# Patient Record
(demographics unavailable — no encounter records)

---

## 2024-11-06 NOTE — PLAN
[FreeTextEntry1] : Discussed at length with patient about triggers for tongue and lip swelling.  Provider is concerned about progressing to anaphylaxis, therefore EpiPen and  prednisone was ordered for prn use, along with the Diphendydramine  for as needed use. food allergies ordered, and patient will see allergist. Pt is an RN, and understands epi use

## 2024-11-06 NOTE — REVIEW OF SYSTEMS
[Heartburn] : heartburn [Joint Stiffness] : joint stiffness [Negative] : Heme/Lymph [FreeTextEntry4] : see hpi [FreeTextEntry7] : cannot take prilosec, takes tums [FreeTextEntry9] : hands are stiff at night

## 2024-11-06 NOTE — HISTORY OF PRESENT ILLNESS
[FreeTextEntry1] : f/u, BW, med renewal [FreeTextEntry8] : 6/5/2020: episodes of tongue and lip swelling on and off times 9 months ( About once every other month). Pt cannot pinpoint the trigger. symptoms relieved with advil pm. Patients sob, throat closing, wheezing or stridor. Last night, pt had trouble swallowing. \par  Pt also states that she as unrelated esophageal spasms on occaision when swallowing.\par  \par  Pt had maintained social isolation in [place] during pandemic, and was sick with a mild cough in january\par  otherwise doing well [de-identified] : 11/5/17: has hives from unknown etiology. saw derm and allergist.   8/21/24: doing well, except increased barbecue in summer, and is concerned about blood work. going to [place] 4 months a year. Patient is concerned with low heart rate on BB, although, it seems to have helped the PAC burden she had been experiencing. Patient very worried about the possibility of  a-fib. also has new lesion on leg.   5/24/2023: Concerned about atrial fibrillation, given history of PACs. Denies c/p, SOB, palpitations.  living in [place] 6 months of the year. daughter doing well in [place], grandchildren doing well.  difficulty losing weight  4/12/2022: coughing most of winter; finally relieved with antihistamine and protonix.   5/6/2021: saw allergist for lip swelling, who could not identify the trigger. Takes an antihistamine with relief when it happens. spending winter in [place], and loves it. had palpitations from prilosec.  9/11/2020: allergy w/u: cats

## 2024-11-06 NOTE — HEALTH RISK ASSESSMENT
[Very Good] : ~his/her~  mood as very good [Yes] : Yes [2 - 4 times a month (2 pts)] : 2-4 times a month (2 points) [1 or 2 (0 pts)] : 1 or 2 (0 points) [Never (0 pts)] : Never (0 points) [No] : In the past 12 months have you used drugs other than those required for medical reasons? No [No falls in past year] : Patient reported no falls in the past year [0] : 2) Feeling down, depressed, or hopeless: Not at all (0) [Audit-CScore] : 2 [de-identified] : walks [de-identified] : reg [[id number]] : 0 [Patient reported bone density results were normal] : Patient reported bone density results were normal [Patient reported colonoscopy was normal] : Patient reported colonoscopy was normal [Change in mental status noted] : No change in mental status noted [Language] : denies difficulty with language [Learning/Retaining New Information] : denies difficulty learning/retaining new information [Handling Complex Tasks] : denies difficulty handling complex tasks [Reasoning] : denies difficulty with reasoning [Spatial Ability and Orientation] : denies difficulty with spatial ability and orientation [None] : None [MammogramDate] : 9/24 [BoneDensityDate] : 9/24 [ColonoscopyDate] : 2012 [ColonoscopyComments] : negative cologuard

## 2024-11-26 NOTE — HISTORY OF PRESENT ILLNESS
[FreeTextEntry1] : 79F with HTN, HLD, PAC's/PVC's  Is feeling generally well Patient denies chest pain, shortness of breath, dizziness, lightheadedness, syncope. Started on Toprol for 44% PAC burden on ZIO  HR has dropped but no real symptoms  Tolerating without any adverse effects, still experiences "extra beats" Has cut back on caffeine  Simvastatin switched to rosuvastatin by PCP for better control  Went for calcium score yesterday, results pending  TTE with normal function  LDL 99, A1C 5.7%  Remote smoking hx. 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   ECG: SB @ 51 with PACs TTE 8/2023:  1. Normal left ventricular cavity size. Left ventricular wall thickness is normal. Left ventricular systolic function is normal with an ejection fraction of 77 % by Sorenson's method of disks. There are no regional wall motion abnormalities seen. 2. There is normal left ventricular diastolic function, with normal filling pressure. 3. Normal right ventricular cavity size, normal right ventricular wall thickness and normal right ventricular systolic function. The tricuspid annular plane systolic excursion (TAPSE) is 2.7 cm (normal >=1.7 cm). 4. The left atrium is normal in size. 5. There is mild anterior calcification of the mitral valve annulus. Mild mitral regurgitation. 6. Estimated pulmonary artery systolic pressure is 37 mmHg, consistent with borderline pulmonary hypertension.  ZIO: 44% PAC burden  Rosuvastatin 5mg  Ramipril 2.5mg  Toprol 25mg

## 2024-11-26 NOTE — DISCUSSION/SUMMARY
[FreeTextEntry1] : 79F with HTN, HLD, PAC's/PVC's  HTN: BP stable, borderline low. Cont ramipril, Toprol. K 5.0.    HLD: Lipids improving on rosuvastatin, LDL 99, continue. Pending CAC score results   PAC's/PVC's: 44% PAC burden on ZIO. No symptoms. Now on Toprol. Limit caffeine, increase hydration.  Consider repeat ZIO to see if burden has changed,   Normal LV function on TTE, consider repeat.    RV 6M

## 2024-11-26 NOTE — PHYSICAL EXAM

## 2025-05-21 NOTE — HISTORY OF PRESENT ILLNESS
[FreeTextEntry1] : f/u [de-identified] : 05/21/2025 patient is present for a f/u , reports feeling well, no acute complaints as per today, would like to do routine blood work. came back from [place] a week ago   8/21/24: doing well, except increased barbecue in summer, and is concerned about blood work. going to [place] 4 months a year. Patient is concerned with low heart rate on BB, although, it seems to have helped the PAC burden she had been experiencing. Patient very worried about the possibility of  a-fib. also has new lesion on leg.   5/24/2023: Concerned about atrial fibrillation, given history of PACs. Denies c/p, SOB, palpitations.  living in [place] 6 months of the year. daughter doing well in [place], grandchildren doing well.  difficulty losing weight  4/12/2022: coughing most of winter; finally relieved with antihistamine and protonix.   5/6/2021: saw allergist for lip swelling, who could not identify the trigger. Takes an antihistamine with relief when it happens. spending winter in [place], and loves it. had palpitations from [employer].  9/11/2020: allergy w/u: cats [FreeTextEntry8] : 6/5/2020: episodes of tongue and lip swelling on and off times 9 months ( About once every other month). Pt cannot pinpoint the trigger. symptoms relieved with advil pm. Patients sob, throat closing, wheezing or stridor. Last night, pt had trouble swallowing. \par  Pt also states that she as unrelated esophageal spasms on occaision when swallowing.\par  \par  Pt had maintained social isolation in [place] during pandemic, and was sick with a mild cough in january\par  otherwise doing well

## 2025-05-21 NOTE — HEALTH RISK ASSESSMENT
[Very Good] : ~his/her~  mood as very good [Yes] : Yes [2 - 4 times a month (2 pts)] : 2-4 times a month (2 points) [1 or 2 (0 pts)] : 1 or 2 (0 points) [Never (0 pts)] : Never (0 points) [No] : In the past 12 months have you used drugs other than those required for medical reasons? No [No falls in past year] : Patient reported no falls in the past year [0] : 2) Feeling down, depressed, or hopeless: Not at all (0) [Patient reported bone density results were normal] : Patient reported bone density results were normal [Patient reported colonoscopy was normal] : Patient reported colonoscopy was normal [None] : None [Audit-CScore] : 2 [de-identified] : walks [de-identified] : reg [[id number]] : 0 [Change in mental status noted] : No change in mental status noted [Language] : denies difficulty with language [Learning/Retaining New Information] : denies difficulty learning/retaining new information [Handling Complex Tasks] : denies difficulty handling complex tasks [Reasoning] : denies difficulty with reasoning [Spatial Ability and Orientation] : denies difficulty with spatial ability and orientation [MammogramDate] : 9/24 [BoneDensityDate] : 9/24 [ColonoscopyDate] : 2012 [ColonoscopyComments] : negative cologuard

## 2025-05-27 NOTE — HISTORY OF PRESENT ILLNESS
[FreeTextEntry1] : 79F with HTN, HLD, PAC's/PVC's, coronary calcification   Recently went for CAC score- was 269 Pt feeling overall well  Patient denies chest pain, shortness of breath, dizziness, lightheadedness, syncope. Doing pool aerobics, walking a mile daily  Started on Toprol for 44% PAC burden on ZIO  HR has dropped but no real symptoms  Tolerating without any adverse effects, occasional palpitations  Has cut back on caffeine   Simvastatin switched to rosuvastatin by PCP for better control   Remote smoking hx. 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   ECG: SB @ 51 with PACs TTE 8/2023:  1. Normal left ventricular cavity size. Left ventricular wall thickness is normal. Left ventricular systolic function is normal with an ejection fraction of 77 % by Sorenson's method of disks. There are no regional wall motion abnormalities seen. 2. There is normal left ventricular diastolic function, with normal filling pressure. 3. Normal right ventricular cavity size, normal right ventricular wall thickness and normal right ventricular systolic function. The tricuspid annular plane systolic excursion (TAPSE) is 2.7 cm (normal >=1.7 cm). 4. The left atrium is normal in size. 5. There is mild anterior calcification of the mitral valve annulus. Mild mitral regurgitation. 6. Estimated pulmonary artery systolic pressure is 37 mmHg, consistent with borderline pulmonary hypertension.  ZIO: 44% PAC burden  Rosuvastatin 5mg  Ramipril 2.5mg  Toprol 25mg

## 2025-05-27 NOTE — DISCUSSION/SUMMARY
[FreeTextEntry1] : 79F with HTN, HLD, PAC's/PVC's  HTN: BP stable, borderline low. Cont ramipril, Toprol. K 5.1  HLD: Lipids improving on rosuvastatin, LDL 95, continue  PAC's/PVC's: 44% PAC burden on ZIO. No symptoms. Now on Toprol. Limit caffeine, increase hydration.  Consider repeat ZIO to see if burden has changed, no ectopy on ECG today. Sinus bo, no symptoms   CAC: Continue statin, hold on asa given age   Normal LV function on TTE, repeat TTE  RV 6M  [EKG obtained to assist in diagnosis and management of assessed problem(s)] : EKG obtained to assist in diagnosis and management of assessed problem(s)

## 2025-07-24 NOTE — HISTORY OF PRESENT ILLNESS
[Never] : never [TextBox_4] : THis is a 79 year old female who presents for evaluation of interstitial lung abnormalities.  This was incidentally found on CT coronary imaging.  Prior to last year she has had no imaging done as she has had no issues with her lungs.  She denies any difficulty with her breathing.  She is rather active.  Yesterday went to [facility] with her daughter and grandson and was able to take the train, climb the steps, take the subway and walk to the city and keep up at the same pace as them.  Aside from lower back pain she denies any other joint pains.  She denies any symptoms of Raynaud's.  No dry eyes or dry mouth.  She reports that when she was on Protonix she had wheezing and episodes of bronchitis. She still experiences intermittent reflux for which Tums makes a big difference. No dry eyes and dry mouth Sleeps flat No rashes or redness on the face. Previous smoker, quit 50 years ago Has been in the same home since 2016 New construction. No water leaks/damage No Pets or birds in the home. Does have a home in [place] but she denies any moisture/mold. Mother with probable RA. No family history of lung disease.  Former social smoker, quit 50 years ago. Worked as a nurse in dialysis.

## 2025-07-24 NOTE — ASSESSMENT
[FreeTextEntry1] : This is a 79-year-old female presenting with incidental findings found on chest CT.  This falls within the realm of interstitial lung abnormalities.  Patient is asymptomatic at this time and does not have high risk features that would suggest an underlying interstitial lung disease like known history of connective tissue disease or family history of pulmonary fibrosis.  There is also no evidence of honeycombing or traction bronchiolectasis which portends a worse prognosis.  In light of recurrent bronchitis and reflux symptoms and distribution of disease which is a lower lobe subpleural with evidence of bronchiectasis that is outside the realm of traction will do initial workup for connective tissue disease and hypersensitivity pneumonitis given that these are the top 2 causes and may fit with her current picture.  We will need to obtain PFTs.  If this is normal then with no symptoms we would watchfully wait.  TTE findings noted very mild pulmonary hypertension.  Will see with DLCO shows as well as other lab work before pursuing further workup.